# Patient Record
Sex: FEMALE | Race: AMERICAN INDIAN OR ALASKA NATIVE
[De-identification: names, ages, dates, MRNs, and addresses within clinical notes are randomized per-mention and may not be internally consistent; named-entity substitution may affect disease eponyms.]

---

## 2018-05-20 ENCOUNTER — HOSPITAL ENCOUNTER (EMERGENCY)
Dept: HOSPITAL 5 - ED | Age: 43
Discharge: HOME | End: 2018-05-20
Payer: COMMERCIAL

## 2018-05-20 VITALS — DIASTOLIC BLOOD PRESSURE: 77 MMHG | SYSTOLIC BLOOD PRESSURE: 117 MMHG

## 2018-05-20 DIAGNOSIS — Z91.013: ICD-10-CM

## 2018-05-20 DIAGNOSIS — Z91.041: ICD-10-CM

## 2018-05-20 DIAGNOSIS — N30.01: Primary | ICD-10-CM

## 2018-05-20 DIAGNOSIS — Z88.0: ICD-10-CM

## 2018-05-20 DIAGNOSIS — K59.09: ICD-10-CM

## 2018-05-20 LAB
ALBUMIN SERPL-MCNC: 3.7 G/DL (ref 3.9–5)
ALT SERPL-CCNC: 15 UNITS/L (ref 7–56)
BACTERIA #/AREA URNS HPF: (no result) /HPF
BASOPHILS # (AUTO): 0.3 K/MM3 (ref 0–0.1)
BASOPHILS NFR BLD AUTO: 2.9 % (ref 0–1.8)
BILIRUB UR QL STRIP: (no result)
BLOOD UR QL VISUAL: (no result)
BUN SERPL-MCNC: 11 MG/DL (ref 7–17)
BUN/CREAT SERPL: 18 %
CALCIUM SERPL-MCNC: 8.5 MG/DL (ref 8.4–10.2)
EOSINOPHIL # BLD AUTO: 0.1 K/MM3 (ref 0–0.4)
EOSINOPHIL NFR BLD AUTO: 1.2 % (ref 0–4.3)
HCT VFR BLD CALC: 37.9 % (ref 30.3–42.9)
HEMOLYSIS INDEX: 1
HGB BLD-MCNC: 12.8 GM/DL (ref 10.1–14.3)
LIPASE SERPL-CCNC: 12 UNITS/L (ref 13–60)
LYMPHOCYTES # BLD AUTO: 1.2 K/MM3 (ref 1.2–5.4)
LYMPHOCYTES NFR BLD AUTO: 13.9 % (ref 13.4–35)
MCH RBC QN AUTO: 29 PG (ref 28–32)
MCHC RBC AUTO-ENTMCNC: 34 % (ref 30–34)
MCV RBC AUTO: 87 FL (ref 79–97)
MONOCYTES # (AUTO): 0.8 K/MM3 (ref 0–0.8)
MONOCYTES % (AUTO): 9.1 % (ref 0–7.3)
PH UR STRIP: 8 [PH] (ref 5–7)
PLATELET # BLD: 381 K/MM3 (ref 140–440)
PROT UR STRIP-MCNC: (no result) MG/DL
RBC # BLD AUTO: 4.36 M/MM3 (ref 3.65–5.03)
RBC #/AREA URNS HPF: 4 /HPF (ref 0–6)
UROBILINOGEN UR-MCNC: < 2 MG/DL (ref ?–2)
WBC #/AREA URNS HPF: 4 /HPF (ref 0–6)

## 2018-05-20 PROCEDURE — 80053 COMPREHEN METABOLIC PANEL: CPT

## 2018-05-20 PROCEDURE — 74176 CT ABD & PELVIS W/O CONTRAST: CPT

## 2018-05-20 PROCEDURE — 96361 HYDRATE IV INFUSION ADD-ON: CPT

## 2018-05-20 PROCEDURE — 85025 COMPLETE CBC W/AUTO DIFF WBC: CPT

## 2018-05-20 PROCEDURE — 96372 THER/PROPH/DIAG INJ SC/IM: CPT

## 2018-05-20 PROCEDURE — 99284 EMERGENCY DEPT VISIT MOD MDM: CPT

## 2018-05-20 PROCEDURE — 36415 COLL VENOUS BLD VENIPUNCTURE: CPT

## 2018-05-20 PROCEDURE — 84703 CHORIONIC GONADOTROPIN ASSAY: CPT

## 2018-05-20 PROCEDURE — 81001 URINALYSIS AUTO W/SCOPE: CPT

## 2018-05-20 PROCEDURE — 96374 THER/PROPH/DIAG INJ IV PUSH: CPT

## 2018-05-20 PROCEDURE — 83690 ASSAY OF LIPASE: CPT

## 2018-05-20 NOTE — EMERGENCY DEPARTMENT REPORT
ED Abdominal Pain HPI





- General


Chief Complaint: Abdominal Pain


Stated Complaint: ABDOMINAL PAIN


Time Seen by Provider: 18 12:05


Source: patient, family


Mode of arrival: Ambulatory


Limitations: No Limitations





- History of Present Illness


Initial Comments: 





Patient reports abdominal pain 2-3 days with nausea and denies any vomiting.  

She said pain located all over her abdomen and feels crampy.  Reports nausea 

without any vomiting.  Pain is 10 out of 10.  Pain is intermittent.  No 

medication taken for pain.  Patient has a primary care physician.  She denies 

any vaginal bleeding or discharge or any concern for STDs.  Denies any fever or 

chills or back pain.  Denies any cough or chest pain.  Nothing makes pain 

better and nothing makes it worse.  She says she feels bloated.  It was 

reported on triage note the patient was having pain to her right lower quadrant 

but patient says that her kids looked it up on global and thought that she had 

appendicitis and forced her to come to the hospital.  Last bowel movement was 

yesterday.


MD Complaint: abdominal pain


Onset/Timing: 3


-: days(s)


Location: diffuse


Radiation: none


Migration to: no migration


Severity: severe


Severity scale (0 -10): 10


Quality: cramping, fullness


Consistency: intermittent


Improves With: rest


Worsens With: movement


Associated Symptoms: nausea.  denies: vomiting, diarrhea, fever, chills, 

constipation, dysuria, hematemesis, hematochezia, melena, hematuria, anorexia, 

syncope


Treatments Prior to Arrival: other (none)





- Related Data


 Previous Rx's











 Medication  Instructions  Recorded  Last Taken  Type


 


Bisacodyl [Dulcolax] 10 mg PO DAILY PRN #4 tab 18 Unknown Rx


 


Naproxen 500 mg PO Q12H PRN #12 tablet 18 Unknown Rx


 


Sulfamethoxazole/Trimethoprim 1 each PO BID 3 Days #6 tablet 18 Unknown Rx





[Bactrim DS TAB]    











 Allergies











Allergy/AdvReac Type Severity Reaction Status Date / Time


 


iodine Allergy  Swelling Verified 18 10:37


 


Penicillins Allergy  Hives Verified 18 10:37


 


shellfish derived Allergy  Swelling Verified 18 10:37














ED Review of Systems


ROS: 


Stated complaint: ABDOMINAL PAIN


Other details as noted in HPI





Comment: All other systems reviewed and negative


Constitutional: denies: chills, fever


Eyes: denies: eye pain, eye discharge, vision change


ENT: denies: ear pain, throat pain


Respiratory: denies: cough, shortness of breath, SOB with exertion, SOB at rest

, stridor, wheezing


Cardiovascular: denies: chest pain, palpitations, dyspnea on exertion, edema, 

syncope, paroxysmal nocturnal dyspnea


Gastrointestinal: denies: abdominal pain, nausea, vomiting, constipation, 

hematemesis, melena, hematochezia


Genitourinary: denies: urgency, dysuria, frequency, hematuria, discharge, 

abnormal menses, dyspareunia


Musculoskeletal: denies: back pain, joint swelling, arthralgia, myalgia


Skin: denies: rash, lesions


Neurological: denies: headache, weakness, paresthesias





ED Past Medical Hx





- Past Medical History


Previous Medical History?: Yes





- Surgical History


Past Surgical History?: No





- Family History


Family history: hypertension





- Social History


Smoking Status: Never Smoker


Substance Use Type: None


Other Social History: 





Lives with children and works





- Medications


Home Medications: 


 Home Medications











 Medication  Instructions  Recorded  Confirmed  Last Taken  Type


 


Bisacodyl [Dulcolax] 10 mg PO DAILY PRN #4 tab 18  Unknown Rx


 


Naproxen 500 mg PO Q12H PRN #12 tablet 18  Unknown Rx


 


Sulfamethoxazole/Trimethoprim 1 each PO BID 3 Days #6 tablet 18  Unknown 

Rx





[Bactrim DS TAB]     














ED Physical Exam





- General


Limitations: No Limitations


General appearance: alert, in no apparent distress





- Head


Head exam: Present: atraumatic, normocephalic, normal inspection





- Eye


Eye exam: Present: normal appearance, PERRL, EOMI


Pupils: Present: normal accommodation





- ENT


ENT exam: Present: normal exam, normal orophraynx, mucous membranes moist





- Neck


Neck exam: Present: normal inspection, full ROM, other (no C-spine tenderness).

  Absent: tenderness, lymphadenopathy





- Respiratory


Respiratory exam: Present: normal lung sounds bilaterally.  Absent: respiratory 

distress, chest wall tenderness, accessory muscle use





- Cardiovascular


Cardiovascular Exam: Present: regular rate, normal rhythm, normal heart sounds.

  Absent: systolic murmur, diastolic murmur





- GI/Abdominal


GI/Abdominal exam: Present: soft, distended, normal bowel sounds.  Absent: 

tenderness, guarding, rebound, rigid, organomegaly, mass, bruit, pulsatile mass

, hernia





- Extremities Exam


Extremities exam: Present: normal inspection, full ROM, normal capillary refill

, other.  Absent: tenderness, pedal edema, joint swelling, calf tenderness





- Back Exam


Back exam: Present: normal inspection, full ROM.  Absent: tenderness, CVA 

tenderness (R), CVA tenderness (L), muscle spasm, paraspinal tenderness, 

vertebral tenderness, rash noted





- Neurological Exam


Neurological exam: Present: alert, oriented X3, normal gait





- Psychiatric


Psychiatric exam: Present: normal affect, normal mood





- Skin


Skin exam: Present: warm, dry, intact, normal color.  Absent: rash





ED Course


 Vital Signs











  18





  10:37 12:50 14:59


 


Temperature 99.2 F  


 


Pulse Rate 93 H  62


 


Respiratory 18 18 16





Rate   


 


Blood Pressure 142/76  


 


Blood Pressure   117/77





[Left]   


 


O2 Sat by Pulse 98  100





Oximetry   














- Reevaluation(s)


Reevaluation #1: 





18 12:20


Patient for CT scan of abdomen and pelvis without contrast due to shellfish 

allergies.








Reevaluation #2: 





18 14:28


Patient is stable, she received Zofran IV and morphine 8 mg IV for relief of 

nausea and pain.  She was given 1 L of normal saline.  CT of the abdomen and 

pelvis resulted.














ED Medical Decision Making





- Lab Data


Result diagrams: 


 18 11:08





 18 11:08








 Lab Results











  18 Range/Units





  10:57 11:08 11:08 


 


WBC   9.0   (4.5-11.0)  K/mm3


 


RBC   4.36   (3.65-5.03)  M/mm3


 


Hgb   12.8   (10.1-14.3)  gm/dl


 


Hct   37.9   (30.3-42.9)  %


 


MCV   87   (79-97)  fl


 


MCH   29   (28-32)  pg


 


MCHC   34   (30-34)  %


 


RDW   14.0   (13.2-15.2)  %


 


Plt Count   381   (140-440)  K/mm3


 


Lymph % (Auto)   13.9   (13.4-35.0)  %


 


Mono % (Auto)   9.1 H   (0.0-7.3)  %


 


Eos % (Auto)   1.2   (0.0-4.3)  %


 


Baso % (Auto)   2.9 H   (0.0-1.8)  %


 


Lymph #   1.2   (1.2-5.4)  K/mm3


 


Mono #   0.8   (0.0-0.8)  K/mm3


 


Eos #   0.1   (0.0-0.4)  K/mm3


 


Baso #   0.3 H   (0.0-0.1)  K/mm3


 


Seg Neutrophils %   72.9 H   (40.0-70.0)  %


 


Seg Neutrophils #   6.5   (1.8-7.7)  K/mm3


 


Sodium    135 L  (137-145)  mmol/L


 


Potassium    3.9  (3.6-5.0)  mmol/L


 


Chloride    102.5  ()  mmol/L


 


Carbon Dioxide    22  (22-30)  mmol/L


 


Anion Gap    14  mmol/L


 


BUN    11  (7-17)  mg/dL


 


Creatinine    0.6 L  (0.7-1.2)  mg/dL


 


Estimated GFR    > 60  ml/min


 


BUN/Creatinine Ratio    18  %


 


Glucose    98  ()  mg/dL


 


Calcium    8.5  (8.4-10.2)  mg/dL


 


Total Bilirubin    0.50  (0.1-1.2)  mg/dL


 


AST    23  (5-40)  units/L


 


ALT    15  (7-56)  units/L


 


Alkaline Phosphatase    64  ()  units/L


 


Total Protein    7.1  (6.3-8.2)  g/dL


 


Albumin    3.7 L  (3.9-5)  g/dL


 


Albumin/Globulin Ratio    1.1  %


 


Lipase    12 L  (13-60)  units/L


 


HCG, Qual     (Negative)  


 


Urine Color  Yellow    (Yellow)  


 


Urine Turbidity  Clear    (Clear)  


 


Urine pH  8.0 H    (5.0-7.0)  


 


Ur Specific Gravity  1.014    (1.003-1.030)  


 


Urine Protein  <15 mg/dl    (Negative)  mg/dL


 


Urine Glucose (UA)  Neg    (Negative)  mg/dL


 


Urine Ketones  Neg    (Negative)  mg/dL


 


Urine Blood  Sm    (Negative)  


 


Urine Nitrite  Neg    (Negative)  


 


Urine Bilirubin  Neg    (Negative)  


 


Urine Urobilinogen  < 2.0    (<2.0)  mg/dL


 


Ur Leukocyte Esterase  Tr    (Negative)  


 


Urine WBC (Auto)  4.0    (0.0-6.0)  /HPF


 


Urine RBC (Auto)  4.0    (0.0-6.0)  /HPF


 


U Epithel Cells (Auto)  2.0    (0-13.0)  /HPF


 


Urine Bacteria (Auto)  1+    (Negative)  /HPF














  18 Range/Units





  11:08 


 


WBC   (4.5-11.0)  K/mm3


 


RBC   (3.65-5.03)  M/mm3


 


Hgb   (10.1-14.3)  gm/dl


 


Hct   (30.3-42.9)  %


 


MCV   (79-97)  fl


 


MCH   (28-32)  pg


 


MCHC   (30-34)  %


 


RDW   (13.2-15.2)  %


 


Plt Count   (140-440)  K/mm3


 


Lymph % (Auto)   (13.4-35.0)  %


 


Mono % (Auto)   (0.0-7.3)  %


 


Eos % (Auto)   (0.0-4.3)  %


 


Baso % (Auto)   (0.0-1.8)  %


 


Lymph #   (1.2-5.4)  K/mm3


 


Mono #   (0.0-0.8)  K/mm3


 


Eos #   (0.0-0.4)  K/mm3


 


Baso #   (0.0-0.1)  K/mm3


 


Seg Neutrophils %   (40.0-70.0)  %


 


Seg Neutrophils #   (1.8-7.7)  K/mm3


 


Sodium   (137-145)  mmol/L


 


Potassium   (3.6-5.0)  mmol/L


 


Chloride   ()  mmol/L


 


Carbon Dioxide   (22-30)  mmol/L


 


Anion Gap   mmol/L


 


BUN   (7-17)  mg/dL


 


Creatinine   (0.7-1.2)  mg/dL


 


Estimated GFR   ml/min


 


BUN/Creatinine Ratio   %


 


Glucose   ()  mg/dL


 


Calcium   (8.4-10.2)  mg/dL


 


Total Bilirubin   (0.1-1.2)  mg/dL


 


AST   (5-40)  units/L


 


ALT   (7-56)  units/L


 


Alkaline Phosphatase   ()  units/L


 


Total Protein   (6.3-8.2)  g/dL


 


Albumin   (3.9-5)  g/dL


 


Albumin/Globulin Ratio   %


 


Lipase   (13-60)  units/L


 


HCG, Qual  Negative  (Negative)  


 


Urine Color   (Yellow)  


 


Urine Turbidity   (Clear)  


 


Urine pH   (5.0-7.0)  


 


Ur Specific Gravity   (1.003-1.030)  


 


Urine Protein   (Negative)  mg/dL


 


Urine Glucose (UA)   (Negative)  mg/dL


 


Urine Ketones   (Negative)  mg/dL


 


Urine Blood   (Negative)  


 


Urine Nitrite   (Negative)  


 


Urine Bilirubin   (Negative)  


 


Urine Urobilinogen   (<2.0)  mg/dL


 


Ur Leukocyte Esterase   (Negative)  


 


Urine WBC (Auto)   (0.0-6.0)  /HPF


 


Urine RBC (Auto)   (0.0-6.0)  /HPF


 


U Epithel Cells (Auto)   (0-13.0)  /HPF


 


Urine Bacteria (Auto)   (Negative)  /HPF








Urine culture pending





- Radiology Data


Radiology results: report reviewed








CT scan of the abdomen and pelvis without contrast.  Please see below for 

results





Patient: BETTY JOHNSON MR#: [id number] 


: 1975 Acct:[id number] 


Age/Sex: 42 / F ADM Date: 18 


Loc: ED 


Attending Dr: 








Ordering Physician: ELICEO VILLASENOR 


Date of Service: 18 


Procedure(s): CT abdomen pelvis wo con 


Accession Number(s): [id number] 





cc: ELICEO VILLASENOR 








CT scan of abdomen and pelvis without IV contrast: 


History: Abdominal pain. 











Findings: 





Normal lung bases. No pleural pericardial effusion. 





Normal liver spleen pancreas and gallbladder. 





Normal intravenous kidneys and bladder. 





No free intraperitoneal fluid or. No evidence of adenopathy. Normal 


appendix. No evidence of diverticulitis. 





Gaseous: Moderate amount of stool in colon. No bowel distention or wall 


thickening. 





Impression: 





Gaseous colon with moderate volume stool in colon. 





Transcribed By: PTP 


Dictated By: AMERICA ROGERS MD 


Electronically Authenticated By: AMERICA ROGERS MD 


Signed Date/Time: 18 1308 











DD/DT: 18 1306 


TD/TT: 18 1308





- Medical Decision Making





ED course: Patient presents to emergency room complaining of abdominal pain.  

Physical findings for tenderness palpated to abdomen without any guarding or 

rebound.  CT scan of the abdomen and pelvis done without contrast as patient 

has allergic reaction to shellfish which cause swelling.  CT findings shows 

patient with normal appendix, no inflammatory process and colon, normal exam 

except patient with moderate stool in colon.  Please refer to CT scan report 

for details.  Laboratory findings with normal CBC and chemistry, negative 

pregnancy and patient with positive trace leukocyte Estrace, 1+ bacteria and 

small amount of blood with elevation in pH of 8.0.. She reports that she has 

gone to her primary care physician physical exam on several occasion and she 

told her that she had a urinary tract infection but she is not having any 

symptoms so this although she was prescribed antibiotic she did not take the 

antibiotic.  She denies any symptoms of urinary burning, frequency or urgency 

in emergency room.  Abdominal pain is new and she is concerned for appendicitis 

because she said her children went on the Internet and thought that she had 

problems with her appendix and she is here to be evaluated per patient.  I 

discussed the patient her CT scan results, diagnosis and need to follow-up.  I 

also discussed with her that I'll put her on antibiotic for 3 days for urinary 

tract infection and she needs to follow up with her primary care physician on 

the fourth day for reevaluation of her urine and also she needs to follow-up 

with gastroenterologist for constipation.  Discharge instruction given on 

increasing fiber in diet, constipation and UTI.  She voiced understanding and 

and patient was treated in emergency room with 1 L of IV fluid, Zofran 8 mg IV 

and morphine 4 mg IV for pain and patient has no nausea and pain at present.  

She is tolerating nothing by mouth liquids well.  Patient discharged from ED 

with family  in stable with prescription for Bactrim DS, bisacodyl and naproxen 


Critical care attestation.: 


If time is entered above; I have spent that time in minutes in the direct care 

of this critically ill patient, excluding procedure time.








ED Disposition


Clinical Impression: 


Abdominal pain


Qualifiers:


 Abdominal location: generalized Qualified Code(s): R10.84 - Generalized 

abdominal pain





Constipation


Qualifiers:


 Constipation type: other constipation type Qualified Code(s): K59.09 - Other 

constipation





UTI (urinary tract infection)


Qualifiers:


 Urinary tract infection type: acute cystitis Hematuria presence: with 

hematuria Qualified Code(s): N30.01 - Acute cystitis with hematuria





Disposition:  TO HOME OR SELFCARE


Is pt being admited?: No


Does the pt Need Aspirin: No


Condition: Stable


Instructions:  Constipation (ED), High Fiber Diet (ED), Abdominal Pain (ED), 

Urinary Tract Infection in Women (ED)


Additional Instructions: 


Please see discharge instructions on high fiber diet


Follow-up with gastroenterologist for constipation


Follow up with   primary care physician for repeat urinalysis after completion 

of antibiotic.


Increase fluid intake to 2-3 L of water/cranberry juice daily.


Prescriptions: 


Bisacodyl [Dulcolax] 10 mg PO DAILY PRN #4 tab


 PRN Reason: Constipation


Naproxen 500 mg PO Q12H PRN #12 tablet


 PRN Reason: abdominal cramping


Sulfamethoxazole/Trimethoprim [Bactrim DS TAB] 1 each PO BID 3 Days #6 tablet


Referrals: 


PRIMARY CARE,MD [Primary Care Provider] - 18


[facility] [Provider Group] - 18


Forms:  Accompanied Note, Work/School Release Form(ED)

## 2018-05-20 NOTE — CAT SCAN REPORT
CT scan of abdomen and pelvis without IV contrast:

History: Abdominal pain.



Findings:



Normal lung bases. No pleural pericardial effusion.



Normal liver spleen pancreas and gallbladder.



Normal intravenous kidneys and bladder.



No free intraperitoneal fluid or. No evidence of adenopathy. Normal 

appendix. No evidence of diverticulitis.



Gaseous: Moderate amount of stool in colon. No bowel distention or wall 

thickening.



Impression:



Gaseous colon with moderate volume stool in colon.